# Patient Record
Sex: MALE
[De-identification: names, ages, dates, MRNs, and addresses within clinical notes are randomized per-mention and may not be internally consistent; named-entity substitution may affect disease eponyms.]

---

## 2022-01-26 ENCOUNTER — DASHBOARD ENCOUNTERS (OUTPATIENT)
Age: 67
End: 2022-01-26

## 2022-01-26 ENCOUNTER — OFFICE VISIT (OUTPATIENT)
Dept: URBAN - METROPOLITAN AREA CLINIC 42 | Facility: CLINIC | Age: 67
End: 2022-01-26
Payer: COMMERCIAL

## 2022-01-26 DIAGNOSIS — D50.0 IRON DEFICIENCY ANEMIA DUE TO CHRONIC BLOOD LOSS: ICD-10-CM

## 2022-01-26 DIAGNOSIS — R74.8 ELEVATED ALKALINE PHOSPHATASE LEVEL: ICD-10-CM

## 2022-01-26 PROCEDURE — 99204 OFFICE O/P NEW MOD 45 MIN: CPT | Performed by: INTERNAL MEDICINE

## 2022-01-26 PROCEDURE — 99244 OFF/OP CNSLTJ NEW/EST MOD 40: CPT | Performed by: INTERNAL MEDICINE

## 2022-01-26 RX ORDER — CYANOCOBALAMIN/FOLIC AC/VIT B6 115-0.8-1
1 TABLET TABLET ORAL ONCE A DAY
Status: ACTIVE | COMMUNITY

## 2022-01-26 RX ORDER — TESTOSTERONE 200 MG
1 PUMP TO SKIN IN THE MORNING TO THE UNDERARM PELLET (EA) IMPLANTATION
Status: ACTIVE | COMMUNITY

## 2022-01-26 RX ORDER — BUSPIRONE HYDROCHLORIDE 10 MG/1
1 TABLET TABLET ORAL TWICE A DAY
Status: ACTIVE | COMMUNITY

## 2022-01-26 RX ORDER — PROPRANOLOL HYDROCHLORIDE 10 MG/1
1 TABLET TABLET ORAL ONCE A DAY
Status: ACTIVE | COMMUNITY

## 2022-01-26 RX ORDER — PREDNISONE 5 MG/1
1 TABLET TABLET ORAL ONCE A DAY
Status: ACTIVE | COMMUNITY

## 2022-01-26 RX ORDER — METHOTREXATE 2.5 MG/1
AS DIRECTED TABLET ORAL
Status: ACTIVE | COMMUNITY

## 2022-01-26 RX ORDER — RISEDRONATE SODIUM 30.1; 4.9 MG/1; MG/1
1 TABLET IMMEDIATELY FOLLOWING BREAKFAST WITH AT LEAST 4 OUNCES OF PLAIN WATER TABLET, DELAYED RELEASE ORAL
Status: ACTIVE | COMMUNITY

## 2022-01-26 RX ORDER — HYDROMORPHONE HYDROCHLORIDE 8 MG/1
1 TABLET AS NEEDED TABLET ORAL
Status: ACTIVE | COMMUNITY

## 2022-01-26 RX ORDER — LORATADINE 10 MG
AS DIRECTED TABLET,DISINTEGRATING ORAL ONCE A DAY
Status: ACTIVE | COMMUNITY

## 2022-01-26 RX ORDER — GABAPENTIN 300 MG/1
1 CAPSULE CAPSULE ORAL TID
Status: ACTIVE | COMMUNITY

## 2022-01-26 RX ORDER — SERTRALINE 100 MG/1
1 TABLET TABLET, FILM COATED ORAL ONCE A DAY
Status: ACTIVE | COMMUNITY

## 2022-01-26 RX ORDER — FENTANYL 100 UG/H
1 PATCH TO SKIN PATCH TRANSDERMAL
Status: ACTIVE | COMMUNITY

## 2022-01-26 RX ORDER — SODIUM PICOSULFATE, MAGNESIUM OXIDE, AND ANHYDROUS CITRIC ACID 10; 3.5; 12 MG/160ML; G/160ML; G/160ML
160 ML LIQUID ORAL
Qty: 320 MILLILITER | Refills: 0 | OUTPATIENT
Start: 2022-01-26 | End: 2022-01-27

## 2022-01-26 RX ORDER — FLUDROCORTISONE ACETATE 0.1 MG/1
1 TABLET TABLET ORAL ONCE A DAY
Status: ACTIVE | COMMUNITY

## 2022-01-26 RX ORDER — FERROUS GLUCONATE 324 MG
1 TABLET WITH WATER OR JUICE BETWEEN MEALS TABLET ORAL ONCE A DAY
Status: ACTIVE | COMMUNITY

## 2022-01-26 NOTE — HPI-TODAY'S VISIT:
The patient is a 67 yo male with granulomatous polyangiitis  who was referred by Dr. Tamika Fajardo for elevated alkaline phosphatase and iron deficiency anemia .   A copy of this document is being forwarded to the referring provider.  The patient was recently found to have elevated alkaline phosphatase on routine lab check. He was also found to have a drop in Hgb from 11.3 to 9.8 over a two month period from 9/21 to 11/21.  He overall denies any GI symptoms or overt GI bleeding.  Has never had EGD/colonoscopy.  No family hx of GI malignancies.

## 2022-01-28 PROBLEM — 724556004: Status: ACTIVE | Noted: 2022-01-26

## 2022-01-31 ENCOUNTER — OFFICE VISIT (OUTPATIENT)
Dept: URBAN - METROPOLITAN AREA MEDICAL CENTER 26 | Facility: MEDICAL CENTER | Age: 67
End: 2022-01-31
Payer: COMMERCIAL

## 2022-01-31 ENCOUNTER — TELEPHONE ENCOUNTER (OUTPATIENT)
Dept: URBAN - METROPOLITAN AREA CLINIC 92 | Facility: CLINIC | Age: 67
End: 2022-01-31

## 2022-01-31 DIAGNOSIS — K29.30 CHRONIC SUPERFICIAL GASTRITIS: ICD-10-CM

## 2022-01-31 DIAGNOSIS — D50.9 ANEMIA: ICD-10-CM

## 2022-01-31 DIAGNOSIS — D01.0 MALIGNANT NEOPLASM IN SITU OF COLON: ICD-10-CM

## 2022-01-31 PROCEDURE — 45381 COLONOSCOPY SUBMUCOUS NJX: CPT | Performed by: INTERNAL MEDICINE

## 2022-01-31 PROCEDURE — 45380 COLONOSCOPY AND BIOPSY: CPT | Performed by: INTERNAL MEDICINE

## 2022-01-31 PROCEDURE — 43239 EGD BIOPSY SINGLE/MULTIPLE: CPT | Performed by: INTERNAL MEDICINE

## 2022-01-31 RX ORDER — BUSPIRONE HYDROCHLORIDE 10 MG/1
1 TABLET TABLET ORAL TWICE A DAY
Status: ACTIVE | COMMUNITY

## 2022-01-31 RX ORDER — FLUDROCORTISONE ACETATE 0.1 MG/1
1 TABLET TABLET ORAL ONCE A DAY
Status: ACTIVE | COMMUNITY

## 2022-01-31 RX ORDER — CYANOCOBALAMIN/FOLIC AC/VIT B6 115-0.8-1
1 TABLET TABLET ORAL ONCE A DAY
Status: ACTIVE | COMMUNITY

## 2022-01-31 RX ORDER — TESTOSTERONE 200 MG
1 PUMP TO SKIN IN THE MORNING TO THE UNDERARM PELLET (EA) IMPLANTATION
Status: ACTIVE | COMMUNITY

## 2022-01-31 RX ORDER — SERTRALINE 100 MG/1
1 TABLET TABLET, FILM COATED ORAL ONCE A DAY
Status: ACTIVE | COMMUNITY

## 2022-01-31 RX ORDER — RISEDRONATE SODIUM 30.1; 4.9 MG/1; MG/1
1 TABLET IMMEDIATELY FOLLOWING BREAKFAST WITH AT LEAST 4 OUNCES OF PLAIN WATER TABLET, DELAYED RELEASE ORAL
Status: ACTIVE | COMMUNITY

## 2022-01-31 RX ORDER — LORATADINE 10 MG
AS DIRECTED TABLET,DISINTEGRATING ORAL ONCE A DAY
Status: ACTIVE | COMMUNITY

## 2022-01-31 RX ORDER — METHOTREXATE 2.5 MG/1
AS DIRECTED TABLET ORAL
Status: ACTIVE | COMMUNITY

## 2022-01-31 RX ORDER — PROPRANOLOL HYDROCHLORIDE 10 MG/1
1 TABLET TABLET ORAL ONCE A DAY
Status: ACTIVE | COMMUNITY

## 2022-01-31 RX ORDER — FENTANYL 100 UG/H
1 PATCH TO SKIN PATCH TRANSDERMAL
Status: ACTIVE | COMMUNITY

## 2022-01-31 RX ORDER — HYDROMORPHONE HYDROCHLORIDE 8 MG/1
1 TABLET AS NEEDED TABLET ORAL
Status: ACTIVE | COMMUNITY

## 2022-01-31 RX ORDER — PREDNISONE 5 MG/1
1 TABLET TABLET ORAL ONCE A DAY
Status: ACTIVE | COMMUNITY

## 2022-01-31 RX ORDER — FERROUS GLUCONATE 324 MG
1 TABLET WITH WATER OR JUICE BETWEEN MEALS TABLET ORAL ONCE A DAY
Status: ACTIVE | COMMUNITY

## 2022-01-31 RX ORDER — GABAPENTIN 300 MG/1
1 CAPSULE CAPSULE ORAL TID
Status: ACTIVE | COMMUNITY

## 2022-02-02 ENCOUNTER — TELEPHONE ENCOUNTER (OUTPATIENT)
Dept: URBAN - METROPOLITAN AREA CLINIC 42 | Facility: CLINIC | Age: 67
End: 2022-02-02